# Patient Record
Sex: MALE | Race: WHITE | NOT HISPANIC OR LATINO
[De-identification: names, ages, dates, MRNs, and addresses within clinical notes are randomized per-mention and may not be internally consistent; named-entity substitution may affect disease eponyms.]

---

## 2019-01-24 ENCOUNTER — RECORDS - HEALTHEAST (OUTPATIENT)
Dept: ADMINISTRATIVE | Facility: OTHER | Age: 64
End: 2019-01-24

## 2019-01-28 ENCOUNTER — HOSPITAL ENCOUNTER (OUTPATIENT)
Dept: NUCLEAR MEDICINE | Facility: HOSPITAL | Age: 64
Discharge: HOME | End: 2019-01-28

## 2019-01-28 ENCOUNTER — HOSPITAL ENCOUNTER (OUTPATIENT)
Dept: CT IMAGING | Facility: HOSPITAL | Age: 64
Discharge: HOME | End: 2019-01-28

## 2019-01-28 DIAGNOSIS — C61 PROSTATE CANCER (H): ICD-10-CM

## 2019-01-28 LAB
CREAT BLD-MCNC: 1.1 MG/DL
POC GFR AMER AF HE - HISTORICAL: >60 ML/MIN/1.73M2
POC GFR NON AMER AF HE - HISTORICAL: >60 ML/MIN/1.73M2

## 2019-03-04 ENCOUNTER — RECORDS - HEALTHEAST (OUTPATIENT)
Dept: ADMINISTRATIVE | Facility: OTHER | Age: 64
End: 2019-03-04

## 2019-03-19 ASSESSMENT — MIFFLIN-ST. JEOR: SCORE: 1955.6

## 2019-03-25 ENCOUNTER — ANESTHESIA - HEALTHEAST (OUTPATIENT)
Dept: SURGERY | Facility: HOSPITAL | Age: 64
End: 2019-03-25

## 2019-03-26 ENCOUNTER — AMBULATORY - HEALTHEAST (OUTPATIENT)
Dept: OTHER | Facility: CLINIC | Age: 64
End: 2019-03-26

## 2019-03-26 ENCOUNTER — SURGERY - HEALTHEAST (OUTPATIENT)
Dept: SURGERY | Facility: HOSPITAL | Age: 64
End: 2019-03-26

## 2019-03-26 ASSESSMENT — MIFFLIN-ST. JEOR: SCORE: 1972.39

## 2021-05-27 NOTE — ANESTHESIA POSTPROCEDURE EVALUATION
Patient: Colt Busch  ROBOTIC ASSISTED RADICAL RETROPUBIC PROSTATECTOMY BILATERAL PELVIC LYMPH NODE DISSECTION  Anesthesia type: general    Patient location: PACU  Last vitals:   Vitals:    03/26/19 1015   BP: 160/75   Pulse: 74   Resp: 9   Temp:    SpO2: 96%     Post vital signs: stable  Level of consciousness: very alert and conversing  Post-anesthesia pain: pain controlled  Post-anesthesia nausea and vomiting: no  Pulmonary: supplemental oxygen when seen  Cardiovascular: stable and blood pressure at baseline  Hydration: adequate  Anesthetic events: no    QCDR Measures:  ASA# 11 - Ila-op Cardiac Arrest: ASA11B - Patient did NOT experience unanticipated cardiac arrest  ASA# 12 - Ila-op Mortality Rate: ASA12B - Patient did NOT die  ASA# 13 - PACU Re-Intubation Rate: ASA13B - Patient did NOT require a new airway mgmt  ASA# 10 - Composite Anes Safety: ASA10A - No serious adverse event    Additional Notes:

## 2021-05-27 NOTE — ANESTHESIA CARE TRANSFER NOTE
Last vitals:   Vitals:    03/26/19 0941   BP: 148/68   Pulse: 78   Resp: 14   Temp: 37.4  C (99.3  F)   SpO2: 96%     Patient's level of consciousness is awake  Spontaneous respirations: yes  Maintains airway independently: yes  Dentition unchanged: yes  Oropharynx: oropharynx clear of all foreign objects    QCDR Measures:  ASA# 20 - Surgical Safety Checklist: WHO surgical safety checklist completed prior to induction    PQRS# 430 - Adult PONV Prevention: 4558F - Pt received => 2 anti-emetic agents (different classes) preop & intraop  ASA# 8 - Peds PONV Prevention: NA - Not pediatric patient, not GA or 2 or more risk factors NOT present  PQRS# 424 - Ila-op Temp Management: 4559F - At least one body temp DOCUMENTED => 35.5C or 95.9F within required timeframe  PQRS# 426 - PACU Transfer Protocol: - Transfer of care checklist used  ASA# 14 - Acute Post-op Pain: ASA14B - Patient did NOT experience pain >= 7 out of 10. Denies nausea,rates pain at 4 on scale 1-10

## 2021-05-27 NOTE — ANESTHESIA PREPROCEDURE EVALUATION
Anesthesia Evaluation      Patient summary reviewed   No history of anesthetic complications     Airway   Mallampati: I  Neck ROM: full   Pulmonary - negative ROS and normal exam                          Cardiovascular - normal exam  Exercise tolerance: > or = 4 METS  (+) hypertension, , hypercholesterolemia,      Neuro/Psych - negative ROS   (-) no neuromuscular disease    Endo/Other    (+) diabetes mellitus type 2 well controlled, obesity,      GI/Hepatic/Renal - negative ROS   (-) renal disease     Other findings: Had NMST 3/15/19 due to EKG changes found at physical, it was negative. Back surgery 5/7/13 for lumbar disc and radiculopathy, issues were resolved by this.  Hg 14.2, K 5.1, GFR>60.  BMI 37.        Dental - normal exam                        Anesthesia Plan  Planned anesthetic: general endotracheal    ASA 3   Induction: intravenous   Anesthetic plan and risks discussed with: patient    Post-op plan: routine recovery

## 2021-06-02 VITALS — HEIGHT: 70 IN | WEIGHT: 263.7 LBS | BODY MASS INDEX: 37.75 KG/M2

## 2021-06-16 PROBLEM — Z87.891 PERSONAL HISTORY OF TOBACCO USE, PRESENTING HAZARDS TO HEALTH: Status: ACTIVE | Noted: 2019-01-19

## 2021-06-16 PROBLEM — G25.81 RESTLESS LEGS SYNDROME (RLS): Status: ACTIVE | Noted: 2019-01-19

## 2021-06-16 PROBLEM — E78.5 HYPERLIPIDEMIA: Status: ACTIVE | Noted: 2019-01-19

## 2021-06-16 PROBLEM — N18.30 CKD (CHRONIC KIDNEY DISEASE), STAGE III (H): Status: ACTIVE | Noted: 2018-02-08

## 2021-06-16 PROBLEM — I10 ESSENTIAL HYPERTENSION, BENIGN: Status: ACTIVE | Noted: 2019-01-19

## 2021-06-16 PROBLEM — N31.8 OTHER FUNCTIONAL DISORDER OF BLADDER: Status: ACTIVE | Noted: 2019-01-19

## 2021-06-16 PROBLEM — R33.8 ACUTE URINARY RETENTION: Status: ACTIVE | Noted: 2019-01-19

## 2021-06-16 PROBLEM — Z00.00 ROUTINE GENERAL MEDICAL EXAMINATION AT A HEALTH CARE FACILITY: Status: ACTIVE | Noted: 2019-01-19

## 2021-06-16 PROBLEM — N40.2 PROSTATE NODULE: Status: ACTIVE | Noted: 2019-01-19

## 2021-06-16 PROBLEM — E66.01 MORBID OBESITY (H): Status: ACTIVE | Noted: 2019-01-19
